# Patient Record
Sex: FEMALE
[De-identification: names, ages, dates, MRNs, and addresses within clinical notes are randomized per-mention and may not be internally consistent; named-entity substitution may affect disease eponyms.]

---

## 2021-01-08 PROBLEM — Z00.00 ENCOUNTER FOR PREVENTIVE HEALTH EXAMINATION: Status: ACTIVE | Noted: 2021-01-08

## 2021-01-18 ENCOUNTER — APPOINTMENT (OUTPATIENT)
Dept: ORTHOPEDIC SURGERY | Facility: CLINIC | Age: 36
End: 2021-01-18
Payer: COMMERCIAL

## 2021-01-18 VITALS
HEART RATE: 90 BPM | SYSTOLIC BLOOD PRESSURE: 129 MMHG | HEIGHT: 65 IN | BODY MASS INDEX: 23.82 KG/M2 | WEIGHT: 143 LBS | DIASTOLIC BLOOD PRESSURE: 86 MMHG | TEMPERATURE: 97.4 F

## 2021-01-18 DIAGNOSIS — M20.012 MALLET FINGER OF LEFT FINGER(S): ICD-10-CM

## 2021-01-18 DIAGNOSIS — Z78.9 OTHER SPECIFIED HEALTH STATUS: ICD-10-CM

## 2021-01-18 PROCEDURE — 99204 OFFICE O/P NEW MOD 45 MIN: CPT

## 2021-01-18 PROCEDURE — 99072 ADDL SUPL MATRL&STAF TM PHE: CPT

## 2021-01-18 PROCEDURE — 73140 X-RAY EXAM OF FINGER(S): CPT | Mod: F2

## 2021-01-18 NOTE — CONSULT LETTER
[Dear  ___] : Dear  [unfilled], [Consult Letter:] : I had the pleasure of evaluating your patient, [unfilled]. [Please see my note below.] : Please see my note below. [Consult Closing:] : Thank you very much for allowing me to participate in the care of this patient.  If you have any questions, please do not hesitate to contact me. [Sincerely,] : Sincerely, [FreeTextEntry3] : Price Harry M.D.\par Surgery of the Hand & Upper Extremity\par Orthopaedic Surgery\par Chief, Hand Service, [facility]\par Director, Hand Service, [facility]\par  of Orthopedic Surgery, [facility] [employer] at [facility] \par [facility]\par \par Email: [email or web address] Office Phone: [phone]\par

## 2021-01-18 NOTE — HISTORY OF PRESENT ILLNESS
[Right] : right hand dominant [FreeTextEntry1] : She comes in today for evaluation of a chronic left middle finger mallet injury.  She injured the finger 5 months ago.  She has worn a splint as much as possible.  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  She has not noted improvement.  She denies pain but has noted a persistent deformity.\par \par She was referred by

## 2021-01-18 NOTE — PHYSICAL EXAM
[de-identified] : - Constitutional: This is a female in no obvious distress.  \par - Psych: Patient is alert and oriented to person, place and time.  Patient has a normal mood and affect.\par - Cardiovascular: Normal pulses throughout the upper extremities.  No significant varicosities are noted in the upper extremities. \par - Neuro: Strength and sensation are intact throughout the upper extremities.  Patient has normal coordination.\par - Respiratory:  Patient exhibits no evidence of shortness of breath or difficulty breathing.\par - Skin: No rashes, lesions, or other abnormalities are noted in the upper extremities.\par \par ---\par \par Examination of her left middle finger demonstrates evidence of a mallet deformity.  She has loss of the terminal 40 degrees of DIP extension.  She has no active pull-through of the terminal extensor tendon.  She has intact flexion.  There is mild swelling along the dorsal aspect.  She is neurovascularly intact distally. [de-identified] : AP, lateral and oblique radiographs of her left little finger demonstrate no obvious fractures or dislocations.

## 2021-01-18 NOTE — DISCUSSION/SUMMARY
[FreeTextEntry1] : He She has findings consistent with a chronic left middle finger mallet deformity, status post an injury 5 months ago.\par \par I had a discussion regarding today's visit, the diagnosis, and treatment recommendations / options.  I would not necessarily recommend surgery.  The results of an SORL reconstruction are unpredictable.  In addition, as she is relatively young, I would not recommend a DIP joint fusion unless she absolutely cannot tolerate the mallet deformity.  She told me that she would like to see if a splint would help.  I told her that at this point in time I do not believe that it well, but she was given a referral for a splint.  She will follow-up if she is not improving to discuss further treatment options.\par \par The patient has agreed to this plan of management and has expressed full understanding.  All questions were fully answered to the patient's satisfaction.\par \par I spent at least 25 minutes in total on this patient's visit.  This included: Preparation for the visit, review of the medical records, review of pertinent diagnostic studies, examination and counseling of the patient, orders of diagnostic tests, medications and/or appropriate procedures and documentation in the medical records of today's visit.  Over 50% of this time was spent on counseling the patient on the above diagnosis, treatment plan and prognosis.

## 2021-01-20 ENCOUNTER — RESULT REVIEW (OUTPATIENT)
Age: 36
End: 2021-01-20

## 2021-02-13 ENCOUNTER — TRANSCRIPTION ENCOUNTER (OUTPATIENT)
Age: 36
End: 2021-02-13